# Patient Record
Sex: FEMALE | URBAN - METROPOLITAN AREA
[De-identification: names, ages, dates, MRNs, and addresses within clinical notes are randomized per-mention and may not be internally consistent; named-entity substitution may affect disease eponyms.]

---

## 2019-10-14 ENCOUNTER — OFFICE VISIT (OUTPATIENT)
Dept: URBAN - METROPOLITAN AREA CLINIC 23 | Facility: CLINIC | Age: 69
End: 2019-10-14
Payer: MEDICARE

## 2019-10-14 DIAGNOSIS — H40.013 OPEN ANGLE WITH BORDERLINE FINDINGS, LOW RISK, BILATERAL: Primary | ICD-10-CM

## 2019-10-14 DIAGNOSIS — H25.13 AGE-RELATED NUCLEAR CATARACT, BILATERAL: ICD-10-CM

## 2019-10-14 PROCEDURE — 92133 CPTRZD OPH DX IMG PST SGM ON: CPT | Performed by: OPHTHALMOLOGY

## 2019-10-14 PROCEDURE — 92020 GONIOSCOPY: CPT | Performed by: OPHTHALMOLOGY

## 2019-10-14 PROCEDURE — 92004 COMPRE OPH EXAM NEW PT 1/>: CPT | Performed by: OPHTHALMOLOGY

## 2019-10-14 PROCEDURE — 76514 ECHO EXAM OF EYE THICKNESS: CPT | Performed by: OPHTHALMOLOGY

## 2019-10-14 ASSESSMENT — INTRAOCULAR PRESSURE
OS: 18
OD: 19

## 2019-10-14 ASSESSMENT — KERATOMETRY
OS: 41.63
OD: 40.40

## 2019-10-14 NOTE — IMPRESSION/PLAN
Impression: Open angle with borderline findings, low risk, bilateral: H40.013.
 - CCT average OU - glaucoma suspect 2ndary to large CD ratio and history of familial glaucoma IOP doing well ou without glaucoma meds - Plan: Discussed diagnosis, explained and understood by patient. Discussed IOP/ONH/Glaucoma management and risks. OCT ordered, performed and reviewed with patient today. No glaucoma treatment recommended OU. Continue to monitor condition and symptoms. Reconsult PRN with Dr. Marlowe Paget.

## 2021-05-05 ENCOUNTER — OFFICE VISIT (OUTPATIENT)
Dept: URBAN - METROPOLITAN AREA CLINIC 23 | Facility: CLINIC | Age: 71
End: 2021-05-05
Payer: MEDICARE

## 2021-05-05 DIAGNOSIS — H25.813 COMBINED FORMS OF AGE-RELATED CATARACT, BILATERAL: Primary | ICD-10-CM

## 2021-05-05 PROCEDURE — 99213 OFFICE O/P EST LOW 20 MIN: CPT | Performed by: OPHTHALMOLOGY

## 2021-05-05 ASSESSMENT — INTRAOCULAR PRESSURE
OD: 18
OS: 18

## 2021-05-05 ASSESSMENT — VISUAL ACUITY
OD: 20/25
OS: 20/25

## 2021-05-05 NOTE — IMPRESSION/PLAN
Impression: Open angle with borderline findings, low risk, bilateral: H40.013.
 - CCT average OU - glaucoma suspect 2ndary to large CD ratio and history of familial glaucoma IOP doing well ou without glaucoma meds - Plan: Discussed diagnosis, explained and understood by patient. Discussed IOP/ONH/Glaucoma management and risks. No glaucoma treatment recommended OU. Continue to monitor condition and symptoms.